# Patient Record
Sex: FEMALE | Race: OTHER | HISPANIC OR LATINO | URBAN - METROPOLITAN AREA
[De-identification: names, ages, dates, MRNs, and addresses within clinical notes are randomized per-mention and may not be internally consistent; named-entity substitution may affect disease eponyms.]

---

## 2018-04-29 ENCOUNTER — EMERGENCY (EMERGENCY)
Facility: HOSPITAL | Age: 43
LOS: 1 days | Discharge: HOME | End: 2018-04-29
Attending: EMERGENCY MEDICINE
Payer: COMMERCIAL

## 2018-04-29 VITALS
TEMPERATURE: 99 F | RESPIRATION RATE: 18 BRPM | DIASTOLIC BLOOD PRESSURE: 55 MMHG | HEART RATE: 60 BPM | OXYGEN SATURATION: 99 % | SYSTOLIC BLOOD PRESSURE: 111 MMHG

## 2018-04-29 VITALS
WEIGHT: 166.01 LBS | HEIGHT: 61 IN | DIASTOLIC BLOOD PRESSURE: 78 MMHG | SYSTOLIC BLOOD PRESSURE: 116 MMHG | HEART RATE: 60 BPM | TEMPERATURE: 98 F | RESPIRATION RATE: 16 BRPM | OXYGEN SATURATION: 100 %

## 2018-04-29 LAB
ANION GAP SERPL CALC-SCNC: 7 MMOL/L — SIGNIFICANT CHANGE UP (ref 5–17)
APPEARANCE UR: CLEAR — SIGNIFICANT CHANGE UP
BASOPHILS # BLD AUTO: 0.1 K/UL — SIGNIFICANT CHANGE UP (ref 0–0.2)
BASOPHILS NFR BLD AUTO: 1 % — SIGNIFICANT CHANGE UP (ref 0–2)
BILIRUB UR-MCNC: NEGATIVE — SIGNIFICANT CHANGE UP
BUN SERPL-MCNC: 11 MG/DL — SIGNIFICANT CHANGE UP (ref 7–18)
CALCIUM SERPL-MCNC: 8.8 MG/DL — SIGNIFICANT CHANGE UP (ref 8.4–10.5)
CHLORIDE SERPL-SCNC: 108 MMOL/L — SIGNIFICANT CHANGE UP (ref 96–108)
CO2 SERPL-SCNC: 24 MMOL/L — SIGNIFICANT CHANGE UP (ref 22–31)
COLOR SPEC: SIGNIFICANT CHANGE UP
CREAT SERPL-MCNC: 0.84 MG/DL — SIGNIFICANT CHANGE UP (ref 0.5–1.3)
DIFF PNL FLD: ABNORMAL
EOSINOPHIL # BLD AUTO: 0.1 K/UL — SIGNIFICANT CHANGE UP (ref 0–0.5)
EOSINOPHIL NFR BLD AUTO: 2 % — SIGNIFICANT CHANGE UP (ref 0–6)
GLUCOSE SERPL-MCNC: 96 MG/DL — SIGNIFICANT CHANGE UP (ref 70–99)
GLUCOSE UR QL: NEGATIVE — SIGNIFICANT CHANGE UP
HCG SERPL-ACNC: 7164 MIU/ML — SIGNIFICANT CHANGE UP
HCT VFR BLD CALC: 42.9 % — SIGNIFICANT CHANGE UP (ref 34.5–45)
HGB BLD-MCNC: 13.5 G/DL — SIGNIFICANT CHANGE UP (ref 11.5–15.5)
KETONES UR-MCNC: NEGATIVE — SIGNIFICANT CHANGE UP
LEUKOCYTE ESTERASE UR-ACNC: NEGATIVE — SIGNIFICANT CHANGE UP
LYMPHOCYTES # BLD AUTO: 2 K/UL — SIGNIFICANT CHANGE UP (ref 1–3.3)
LYMPHOCYTES # BLD AUTO: 28.3 % — SIGNIFICANT CHANGE UP (ref 13–44)
MCHC RBC-ENTMCNC: 29.4 PG — SIGNIFICANT CHANGE UP (ref 27–34)
MCHC RBC-ENTMCNC: 31.6 GM/DL — LOW (ref 32–36)
MCV RBC AUTO: 93.2 FL — SIGNIFICANT CHANGE UP (ref 80–100)
MONOCYTES # BLD AUTO: 0.4 K/UL — SIGNIFICANT CHANGE UP (ref 0–0.9)
MONOCYTES NFR BLD AUTO: 5.3 % — SIGNIFICANT CHANGE UP (ref 2–14)
NEUTROPHILS # BLD AUTO: 4.5 K/UL — SIGNIFICANT CHANGE UP (ref 1.8–7.4)
NEUTROPHILS NFR BLD AUTO: 63.4 % — SIGNIFICANT CHANGE UP (ref 43–77)
NITRITE UR-MCNC: NEGATIVE — SIGNIFICANT CHANGE UP
PH UR: 6 — SIGNIFICANT CHANGE UP (ref 5–8)
PLATELET # BLD AUTO: 380 K/UL — SIGNIFICANT CHANGE UP (ref 150–400)
POTASSIUM SERPL-MCNC: 4.1 MMOL/L — SIGNIFICANT CHANGE UP (ref 3.5–5.3)
POTASSIUM SERPL-SCNC: 4.1 MMOL/L — SIGNIFICANT CHANGE UP (ref 3.5–5.3)
PROT UR-MCNC: NEGATIVE — SIGNIFICANT CHANGE UP
RBC # BLD: 4.6 M/UL — SIGNIFICANT CHANGE UP (ref 3.8–5.2)
RBC # FLD: 12.2 % — SIGNIFICANT CHANGE UP (ref 10.3–14.5)
SODIUM SERPL-SCNC: 139 MMOL/L — SIGNIFICANT CHANGE UP (ref 135–145)
SP GR SPEC: 1.01 — SIGNIFICANT CHANGE UP (ref 1.01–1.02)
UROBILINOGEN FLD QL: NEGATIVE — SIGNIFICANT CHANGE UP
WBC # BLD: 7.1 K/UL — SIGNIFICANT CHANGE UP (ref 3.8–10.5)
WBC # FLD AUTO: 7.1 K/UL — SIGNIFICANT CHANGE UP (ref 3.8–10.5)

## 2018-04-29 PROCEDURE — 81001 URINALYSIS AUTO W/SCOPE: CPT

## 2018-04-29 PROCEDURE — 76801 OB US < 14 WKS SINGLE FETUS: CPT | Mod: 26

## 2018-04-29 PROCEDURE — 99284 EMERGENCY DEPT VISIT MOD MDM: CPT | Mod: 25

## 2018-04-29 PROCEDURE — 76817 TRANSVAGINAL US OBSTETRIC: CPT | Mod: 26

## 2018-04-29 PROCEDURE — 76801 OB US < 14 WKS SINGLE FETUS: CPT

## 2018-04-29 PROCEDURE — 99284 EMERGENCY DEPT VISIT MOD MDM: CPT

## 2018-04-29 PROCEDURE — 84702 CHORIONIC GONADOTROPIN TEST: CPT

## 2018-04-29 PROCEDURE — 80048 BASIC METABOLIC PNL TOTAL CA: CPT

## 2018-04-29 PROCEDURE — 85027 COMPLETE CBC AUTOMATED: CPT

## 2018-04-29 PROCEDURE — 76817 TRANSVAGINAL US OBSTETRIC: CPT

## 2018-04-29 NOTE — ED PROVIDER NOTE - PROGRESS NOTE DETAILS
Patient is resting comfortably, NAD. early IUP vs spontaneous . To f/u with Dr. Jordan tomorrow to compare today's HCG from the one done at the office on . Return to the ED immediately if getting worse, not improving, or if having any new or troubling symptoms.

## 2018-04-29 NOTE — ED ADULT NURSE NOTE - OBJECTIVE STATEMENT
pt is here for vaginal bleeding, lower abd pain since 6pm, 7.5 weeks pregnant, denied pain at this time, pt calm with family member.

## 2018-04-29 NOTE — ED PROVIDER NOTE - OBJECTIVE STATEMENT
43 y/o F pt w/ no significant PMHx,7.5 weeks pregnant, LMP 3.7, c/o vaginal spotting x yesterday. Today spotting became heavier w/ associated vaginal cramping. Denies fever, CP, SOB, nausea, emesis, and diarrhea. OB/GYN did US and told pt that it was too early to visualize fetus. Pt also had blood work done at the time but results are not back yet. . No other complaints. NKDA.

## 2018-04-29 NOTE — ED PROVIDER NOTE - GENITOURINARY, MLM
No discharge, lesions. No adnexal tenderness or masses. Cervix in closed. Small amt of dark red blood noted in the cervix.

## 2018-04-30 ENCOUNTER — INPATIENT (INPATIENT)
Facility: HOSPITAL | Age: 43
LOS: 0 days | Discharge: HOME | DRG: 770 | End: 2018-04-30
Attending: OBSTETRICS & GYNECOLOGY | Admitting: OBSTETRICS & GYNECOLOGY
Payer: COMMERCIAL

## 2018-04-30 VITALS
SYSTOLIC BLOOD PRESSURE: 100 MMHG | HEART RATE: 59 BPM | DIASTOLIC BLOOD PRESSURE: 63 MMHG | WEIGHT: 162.04 LBS | TEMPERATURE: 98 F | RESPIRATION RATE: 18 BRPM | HEIGHT: 61 IN | OXYGEN SATURATION: 99 %

## 2018-04-30 VITALS
DIASTOLIC BLOOD PRESSURE: 63 MMHG | RESPIRATION RATE: 14 BRPM | SYSTOLIC BLOOD PRESSURE: 102 MMHG | HEART RATE: 62 BPM | TEMPERATURE: 98 F | OXYGEN SATURATION: 100 %

## 2018-04-30 DIAGNOSIS — O03.4 INCOMPLETE SPONTANEOUS ABORTION WITHOUT COMPLICATION: ICD-10-CM

## 2018-04-30 DIAGNOSIS — Z98.890 OTHER SPECIFIED POSTPROCEDURAL STATES: Chronic | ICD-10-CM

## 2018-04-30 DIAGNOSIS — O03.9 COMPLETE OR UNSPECIFIED SPONTANEOUS ABORTION WITHOUT COMPLICATION: ICD-10-CM

## 2018-04-30 DIAGNOSIS — Z98.891 HISTORY OF UTERINE SCAR FROM PREVIOUS SURGERY: Chronic | ICD-10-CM

## 2018-04-30 LAB
ALBUMIN SERPL ELPH-MCNC: 3.8 G/DL — SIGNIFICANT CHANGE UP (ref 3.5–5)
ALP SERPL-CCNC: 45 U/L — SIGNIFICANT CHANGE UP (ref 40–120)
ALT FLD-CCNC: 12 U/L DA — SIGNIFICANT CHANGE UP (ref 10–60)
ANION GAP SERPL CALC-SCNC: 7 MMOL/L — SIGNIFICANT CHANGE UP (ref 5–17)
APTT BLD: 33.2 SEC — SIGNIFICANT CHANGE UP (ref 27.5–37.4)
AST SERPL-CCNC: 13 U/L — SIGNIFICANT CHANGE UP (ref 10–40)
BASOPHILS # BLD AUTO: 0.1 K/UL — SIGNIFICANT CHANGE UP (ref 0–0.2)
BASOPHILS NFR BLD AUTO: 0.5 % — SIGNIFICANT CHANGE UP (ref 0–2)
BILIRUB SERPL-MCNC: 0.5 MG/DL — SIGNIFICANT CHANGE UP (ref 0.2–1.2)
BUN SERPL-MCNC: 10 MG/DL — SIGNIFICANT CHANGE UP (ref 7–18)
CALCIUM SERPL-MCNC: 8.6 MG/DL — SIGNIFICANT CHANGE UP (ref 8.4–10.5)
CHLORIDE SERPL-SCNC: 110 MMOL/L — HIGH (ref 96–108)
CO2 SERPL-SCNC: 22 MMOL/L — SIGNIFICANT CHANGE UP (ref 22–31)
CREAT SERPL-MCNC: 0.8 MG/DL — SIGNIFICANT CHANGE UP (ref 0.5–1.3)
EOSINOPHIL # BLD AUTO: 0 K/UL — SIGNIFICANT CHANGE UP (ref 0–0.5)
EOSINOPHIL NFR BLD AUTO: 0.2 % — SIGNIFICANT CHANGE UP (ref 0–6)
GLUCOSE SERPL-MCNC: 97 MG/DL — SIGNIFICANT CHANGE UP (ref 70–99)
HCG SERPL-ACNC: 4442 MIU/ML — SIGNIFICANT CHANGE UP
HCT VFR BLD CALC: 38.8 % — SIGNIFICANT CHANGE UP (ref 34.5–45)
HGB BLD-MCNC: 12.8 G/DL — SIGNIFICANT CHANGE UP (ref 11.5–15.5)
INR BLD: 1.11 RATIO — SIGNIFICANT CHANGE UP (ref 0.88–1.16)
LYMPHOCYTES # BLD AUTO: 1.1 K/UL — SIGNIFICANT CHANGE UP (ref 1–3.3)
LYMPHOCYTES # BLD AUTO: 10.5 % — LOW (ref 13–44)
MCHC RBC-ENTMCNC: 30.7 PG — SIGNIFICANT CHANGE UP (ref 27–34)
MCHC RBC-ENTMCNC: 33.1 GM/DL — SIGNIFICANT CHANGE UP (ref 32–36)
MCV RBC AUTO: 92.8 FL — SIGNIFICANT CHANGE UP (ref 80–100)
MONOCYTES # BLD AUTO: 0.3 K/UL — SIGNIFICANT CHANGE UP (ref 0–0.9)
MONOCYTES NFR BLD AUTO: 2.4 % — SIGNIFICANT CHANGE UP (ref 2–14)
NEUTROPHILS # BLD AUTO: 9 K/UL — HIGH (ref 1.8–7.4)
NEUTROPHILS NFR BLD AUTO: 86.4 % — HIGH (ref 43–77)
PLATELET # BLD AUTO: 334 K/UL — SIGNIFICANT CHANGE UP (ref 150–400)
POTASSIUM SERPL-MCNC: 3.9 MMOL/L — SIGNIFICANT CHANGE UP (ref 3.5–5.3)
POTASSIUM SERPL-SCNC: 3.9 MMOL/L — SIGNIFICANT CHANGE UP (ref 3.5–5.3)
PROT SERPL-MCNC: 7.7 G/DL — SIGNIFICANT CHANGE UP (ref 6–8.3)
PROTHROM AB SERPL-ACNC: 12.1 SEC — SIGNIFICANT CHANGE UP (ref 9.8–12.7)
RBC # BLD: 4.18 M/UL — SIGNIFICANT CHANGE UP (ref 3.8–5.2)
RBC # FLD: 12.2 % — SIGNIFICANT CHANGE UP (ref 10.3–14.5)
SODIUM SERPL-SCNC: 139 MMOL/L — SIGNIFICANT CHANGE UP (ref 135–145)
WBC # BLD: 10.4 K/UL — SIGNIFICANT CHANGE UP (ref 3.8–10.5)
WBC # FLD AUTO: 10.4 K/UL — SIGNIFICANT CHANGE UP (ref 3.8–10.5)

## 2018-04-30 PROCEDURE — 85610 PROTHROMBIN TIME: CPT

## 2018-04-30 PROCEDURE — 86901 BLOOD TYPING SEROLOGIC RH(D): CPT

## 2018-04-30 PROCEDURE — 85027 COMPLETE CBC AUTOMATED: CPT

## 2018-04-30 PROCEDURE — 80053 COMPREHEN METABOLIC PANEL: CPT

## 2018-04-30 PROCEDURE — 99285 EMERGENCY DEPT VISIT HI MDM: CPT

## 2018-04-30 PROCEDURE — 88305 TISSUE EXAM BY PATHOLOGIST: CPT | Mod: 26

## 2018-04-30 PROCEDURE — 88305 TISSUE EXAM BY PATHOLOGIST: CPT

## 2018-04-30 PROCEDURE — 86900 BLOOD TYPING SEROLOGIC ABO: CPT

## 2018-04-30 PROCEDURE — 86850 RBC ANTIBODY SCREEN: CPT

## 2018-04-30 PROCEDURE — 99285 EMERGENCY DEPT VISIT HI MDM: CPT | Mod: 25

## 2018-04-30 PROCEDURE — 85730 THROMBOPLASTIN TIME PARTIAL: CPT

## 2018-04-30 PROCEDURE — 96374 THER/PROPH/DIAG INJ IV PUSH: CPT

## 2018-04-30 PROCEDURE — 84702 CHORIONIC GONADOTROPIN TEST: CPT

## 2018-04-30 RX ORDER — SODIUM CHLORIDE 9 MG/ML
1000 INJECTION INTRAMUSCULAR; INTRAVENOUS; SUBCUTANEOUS ONCE
Qty: 0 | Refills: 0 | Status: COMPLETED | OUTPATIENT
Start: 2018-04-30 | End: 2018-04-30

## 2018-04-30 RX ORDER — IBUPROFEN 200 MG
1 TABLET ORAL
Qty: 28 | Refills: 0
Start: 2018-04-30 | End: 2018-05-06

## 2018-04-30 RX ORDER — IBUPROFEN 200 MG
600 TABLET ORAL ONCE
Qty: 0 | Refills: 0 | Status: DISCONTINUED | OUTPATIENT
Start: 2018-04-30 | End: 2018-04-30

## 2018-04-30 RX ORDER — ACETAMINOPHEN 500 MG
650 TABLET ORAL ONCE
Qty: 0 | Refills: 0 | Status: COMPLETED | OUTPATIENT
Start: 2018-04-30 | End: 2018-04-30

## 2018-04-30 RX ORDER — HYDROMORPHONE HYDROCHLORIDE 2 MG/ML
0.5 INJECTION INTRAMUSCULAR; INTRAVENOUS; SUBCUTANEOUS
Qty: 0 | Refills: 0 | Status: DISCONTINUED | OUTPATIENT
Start: 2018-04-30 | End: 2018-04-30

## 2018-04-30 RX ORDER — MORPHINE SULFATE 50 MG/1
2 CAPSULE, EXTENDED RELEASE ORAL ONCE
Qty: 0 | Refills: 0 | Status: DISCONTINUED | OUTPATIENT
Start: 2018-04-30 | End: 2018-04-30

## 2018-04-30 RX ORDER — SODIUM CHLORIDE 9 MG/ML
1000 INJECTION, SOLUTION INTRAVENOUS
Qty: 0 | Refills: 0 | Status: DISCONTINUED | OUTPATIENT
Start: 2018-04-30 | End: 2018-04-30

## 2018-04-30 RX ORDER — OXYCODONE AND ACETAMINOPHEN 5; 325 MG/1; MG/1
2 TABLET ORAL ONCE
Qty: 0 | Refills: 0 | Status: DISCONTINUED | OUTPATIENT
Start: 2018-04-30 | End: 2018-04-30

## 2018-04-30 RX ORDER — SODIUM CHLORIDE 9 MG/ML
3 INJECTION INTRAMUSCULAR; INTRAVENOUS; SUBCUTANEOUS EVERY 8 HOURS
Qty: 0 | Refills: 0 | Status: DISCONTINUED | OUTPATIENT
Start: 2018-04-30 | End: 2018-04-30

## 2018-04-30 RX ADMIN — MORPHINE SULFATE 2 MILLIGRAM(S): 50 CAPSULE, EXTENDED RELEASE ORAL at 12:22

## 2018-04-30 RX ADMIN — SODIUM CHLORIDE 1000 MILLILITER(S): 9 INJECTION INTRAMUSCULAR; INTRAVENOUS; SUBCUTANEOUS at 12:07

## 2018-04-30 NOTE — DISCHARGE NOTE ADULT - ADDITIONAL INSTRUCTIONS
Pelvic rest,  no sexual intercourse and nothing in vagina ( ie tampons). Motrin as needed for pain. No strenuous activity and  no heavy lifting. Follow up with your gynecologist in 1-2wks for your post op visit

## 2018-04-30 NOTE — DISCHARGE NOTE ADULT - INSTRUCTIONS
none Nothing in vagina, no sex, no tampons, no douching, no baths, no foreign objects Nothing in vagina, no sex, no tampons, no douching, no baths, no foreign objects until your next follow up with your gynecologist in 1-2wks for your post op visit.

## 2018-04-30 NOTE — ED PROVIDER NOTE - MEDICAL DECISION MAKING DETAILS
Pt in first trimester pregnancy w/ vaginal bleeding and abd pain; miscarriage in progress send for D and C w/ Dr. Bee.

## 2018-04-30 NOTE — DISCHARGE NOTE ADULT - CARE PROVIDERS DIRECT ADDRESSES
[email or web address]

## 2018-04-30 NOTE — H&P ADULT - NSHPLABSRESULTS_GEN_ALL_CORE
LABS:                        12.8   10.4  )-----------( 334      ( 2018 12:57 )             38.8         139  |  108  |  11  ----------------------------<  96  4.1   |  24  |  0.84    Ca    8.8      2018 09:36    HCG Quantitative, Serum (18 @ 09:36)    HCG Quantitative, Serum: 7164:  on 2018              hcg 7031 in office on 2018      Urinalysis Basic - ( 2018 09:36 )    Color: Pale Yellow / Appearance: Clear / S.015 / pH: x  Gluc: x / Ketone: Negative  / Bili: Negative / Urobili: Negative   Blood: x / Protein: Negative / Nitrite: Negative   Leuk Esterase: Negative / RBC: 0-2 /HPF / WBC 0-2 /HPF   Sq Epi: x / Non Sq Epi: Few /HPF / Bacteria: Moderate /HPF        RADIOLOGY & ADDITIONAL STUDIES:  sono  < from: US Echo Transvaginal, OB (18 @ 10:35) >  INDICATION: vaginalbleeding.  COMPARISON: 2014.    TECHNIQUE: Transabdominal and transvaginal ultrasound of the pelvis was   performed. Color/spectral Doppler of the ovaries was performed.    FINDINGS:  Uterus measures 7.7 x 4.7 x 4.5 cm. There is an irregular intrauterine   saclike structure with mean sac diameter of 1.3 cm at this represents a   gestational sac, which corresponds to 5 weeks and 4 days. No yolk sac or   fetal pole is visualized.    Right ovary measures 2.6 x 1.5 x 2.6 cm.  Left ovary measures 2.1 x 1.7 x 1.9 cm.  Vascular flow was documented in both ovaries.    No free pelvic fluid detected.    IMPRESSION:  Intrauterine irregular sac like structure may represent an early   gestational sac versus pseudogestational sac. No yolk sac or fetal pole   is visualized. Recommend correlation with beta-hCG and follow-up   ultrasound.    < end of copied text >

## 2018-04-30 NOTE — H&P ADULT - HISTORY OF PRESENT ILLNESS
@ 7.5 weekLMP presents with vb, vaginal discharge; pelvic pain, abd pain, cp, sob, dizziness, palpitations, n/v/d/c, fever; chills     pob/gynhx: G P ; std's; no abn pap smear; no fibroids; meses  pmhx:  pshx:  all:  meds:   sochx: no etoh/drug/tobacco use    REVIEW OF SYSTEMS: see HPI	    PE:  Vital Signs Last 24 Hrs  T(C): 36.4 (2018 11:00), Max: 36.4 (2018 11:00)  T(F): 97.6 (2018 11:00), Max: 97.6 (2018 11:00)  HR: 59 (:) (59 - 59)  BP: 100/63 (2018 11:00) (100/63 - 100/63)  BP(mean): --  RR: 18 (2018 11:00) (18 - 18)  SpO2: 99% (2018 11:00) (99% - 99%)  abd: +bs; soft, nt, nd, no rebound or guarding; no cvat b/l  pelvis: no cmt; no vaginal bleeding or abnormal discharge; no odor, closed/long, no uterine tenderness; uterus approx 8wks size regular in contour, mobile. No adnexal tenderness or masses appreciated b/l     LABS:                        12.8   10.4  )-----------( 334      ( 2018 12:57 )             38.8     04-29    139  |  108  |  11  ----------------------------<  96  4.1   |  24  |  0.84    Ca    8.8      2018 09:36        Urinalysis Basic - ( 2018 09:36 )    Color: Pale Yellow / Appearance: Clear / S.015 / pH: x  Gluc: x / Ketone: Negative  / Bili: Negative / Urobili: Negative   Blood: x / Protein: Negative / Nitrite: Negative   Leuk Esterase: Negative / RBC: 0-2 /HPF / WBC 0-2 /HPF   Sq Epi: x / Non Sq Epi: Few /HPF / Bacteria: Moderate /HPF        RADIOLOGY & ADDITIONAL STUDIES:  sono  ct scan    a/p    -d/w  41 yo F  @ 7.5 week LMP 3/7/2018 presents with worsening vaginal bleeding x 2 days. Patient also report cramping pain x 1 day. Patient was seen in ED on  and discharged with threatened ab precautions. Patient was seen today by GYN Dr Jordan as was diagnosed with incomplete ab and was told to come to hospital for D+C. Patient denies pain at this time, was given Morphine by ED team.  Dr Jordan unavailable to perform D+C at this time, he asked Dr Bee, [place] attending to cover. Denies cp, sob, dizziness, palpitations, n/v/d/c, fever; chills     pob/gynhx:   c-sections x 1 secondary to previous myomectomy. male 8lb 12 oz.  male ;  abn pap smear in , since then all wnl.;  hx of fibroids, normal menses,   pmhx: denies  pshx: , lap myomectomy . left knee acl repair x 2  all: denies  meds: denies  sochx: no etoh/drug/tobacco use    REVIEW OF SYSTEMS: see HPI	    PE:  Vital Signs Last 24 Hrs  T(C): 36.4 (2018 11:00), Max: 36.4 (2018 11:00)  T(F): 97.6 (2018 11:00), Max: 97.6 (2018 11:00)  HR: 59 (2018 11:00) (59 - 59)  BP: 100/63 (2018 11:00) (100/63 - 100/63)  BP(mean): --  RR: 18 (2018 11:00) (18 - 18)  SpO2: 99% (2018 11:00) (99% - 99%)    gen: nad, pain well controlled  abd: +bs; soft, nt, nd, no rebound or guarding; no cvat b/l  pelvis: + dark red blood with clots, cervix about 1 cm dilated, no uterine or adnexal tenderness    LABS:                        12.8   10.4  )-----------( 334      ( 2018 12:57 )             38.8     04-29    139  |  108  |  11  ----------------------------<  96  4.1   |  24  |  0.84    Ca    8.8      2018 09:36    HCG Quantitative, Serum (18 @ 09:36)    HCG Quantitative, Serum: 7164:  on 2018              hcg 7031 in office on 2018      Urinalysis Basic - ( 2018 09:36 )    Color: Pale Yellow / Appearance: Clear / S.015 / pH: x  Gluc: x / Ketone: Negative  / Bili: Negative / Urobili: Negative   Blood: x / Protein: Negative / Nitrite: Negative   Leuk Esterase: Negative / RBC: 0-2 /HPF / WBC 0-2 /HPF   Sq Epi: x / Non Sq Epi: Few /HPF / Bacteria: Moderate /HPF        RADIOLOGY & ADDITIONAL STUDIES:  sono  < from: US Echo Transvaginal, OB (18 @ 10:35) >  INDICATION: vaginalbleeding.  COMPARISON: 2014.    TECHNIQUE: Transabdominal and transvaginal ultrasound of the pelvis was   performed. Color/spectral Doppler of the ovaries was performed.    FINDINGS:  Uterus measures 7.7 x 4.7 x 4.5 cm. There is an irregular intrauterine   saclike structure with mean sac diameter of 1.3 cm at this represents a   gestational sac, which corresponds to 5 weeks and 4 days. No yolk sac or   fetal pole is visualized.    Right ovary measures 2.6 x 1.5 x 2.6 cm.  Left ovary measures 2.1 x 1.7 x 1.9 cm.  Vascular flow was documented in both ovaries.    No free pelvic fluid detected.    IMPRESSION:  Intrauterine irregular sac like structure may represent an early   gestational sac versus pseudogestational sac. No yolk sac or fetal pole   is visualized. Recommend correlation with beta-hCG and follow-up   ultrasound.    < end of copied text >      a/p 42 y F  @ 7.5 weeks with incomplete ab   - will admit for D+C  -coags ordered  -informed consent obtained  - keep npo  -d/w Dr Jordan and Dr Bee

## 2018-04-30 NOTE — H&P ADULT - ASSESSMENT
a/p 42 y F  @ 7.5 weeks with incomplete ab   - will admit for D+C  -coags ordered  -informed consent obtained  - keep npo  -d/w Dr Jordan and Dr Bee

## 2018-04-30 NOTE — DISCHARGE NOTE ADULT - CARE PROVIDER_API CALL
Franklyn Jordan), Obstetrics and Gynecology  [address] [place] [address]  Phone: [phone]  Fax: [phone]    Christina Bee), Obstetrics and Gynecology  [facility] [address] [place] [address]  Phone: [phone]  Fax: [phone]

## 2018-04-30 NOTE — DISCHARGE NOTE ADULT - MEDICATION SUMMARY - MEDICATIONS TO TAKE
I will START or STAY ON the medications listed below when I get home from the hospital:    ibuprofen 600 mg oral tablet  -- 1 tab(s) by mouth every 6 hours, As Needed -moderate pain  -- Indication: For pain

## 2018-04-30 NOTE — DISCHARGE NOTE ADULT - PATIENT PORTAL LINK FT
You can access the [email or web address], offered by [facility], by registering with the following website: [email or web address]

## 2018-04-30 NOTE — DISCHARGE NOTE ADULT - PLAN OF CARE
D+C Pelvic rest,  no sexual intercourse and nothing in vagina ( ie tampons). Motrin as needed for pain. No strenuous activity and  no heavy lifting.  Follow up with your gynecologist in 1-2wks for your post op visit

## 2018-04-30 NOTE — DISCHARGE NOTE ADULT - HOSPITAL COURSE
Patient sent in from office with incomplete ab for D+C   patient underwent dilation and curettage with suction  routine post op care given

## 2018-04-30 NOTE — H&P ADULT - NSHPPHYSICALEXAM_GEN_ALL_CORE
gen: nad, pain well controlled  abd: +bs; soft, nt, nd, no rebound or guarding; no cvat b/l  pelvis: + dark red blood with clots, cervix about 1 cm dilated, no uterine or adnexal tenderness

## 2018-04-30 NOTE — ED PROVIDER NOTE - OBJECTIVE STATEMENT
43 y/o F w/ no pertinent PMHx or PSHx; last LMP 03/07/2018 presents to ED c/o vaginal bleeding. Pt notes she is 7 w 5 days pregnant w/ increasing vaginal bleeding and lower bad pain x few days. Pt was seen here yesterday w/ ultrasound and full evaluation; no fetal pulse detected at that time. Pt was advised to return if worsens. Today Pt was w/ Dr. Peterson in office and referred here for D&C; admit under Dr. Bee. Pt denies any other complaints. NKDA.

## 2018-04-30 NOTE — DISCHARGE NOTE ADULT - CARE PLAN
Principal Discharge DX:	Incomplete   Goal:	D+C  Assessment and plan of treatment:	Pelvic rest,  no sexual intercourse and nothing in vagina ( ie tampons). Motrin as needed for pain. No strenuous activity and  no heavy lifting.  Follow up with your gynecologist in 1-2wks for your post op visit

## 2018-04-30 NOTE — DISCHARGE NOTE ADULT - NS AS ACTIVITY OBS
Showering allowed/Walking-Outdoors allowed/Walking-Indoors allowed/Stairs allowed/No Heavy lifting/straining/Do not make important decisions/Do not drive or operate machinery

## 2018-05-02 LAB — SURGICAL PATHOLOGY FINAL REPORT - CH: SIGNIFICANT CHANGE UP

## 2019-10-22 ENCOUNTER — OUTPATIENT (OUTPATIENT)
Dept: OUTPATIENT SERVICES | Facility: HOSPITAL | Age: 44
LOS: 1 days | End: 2019-10-22
Payer: COMMERCIAL

## 2019-10-22 DIAGNOSIS — Z98.891 HISTORY OF UTERINE SCAR FROM PREVIOUS SURGERY: Chronic | ICD-10-CM

## 2019-10-22 DIAGNOSIS — Z98.890 OTHER SPECIFIED POSTPROCEDURAL STATES: Chronic | ICD-10-CM

## 2019-10-22 DIAGNOSIS — Z3A.39 39 WEEKS GESTATION OF PREGNANCY: ICD-10-CM

## 2019-10-22 DIAGNOSIS — Z01.818 ENCOUNTER FOR OTHER PREPROCEDURAL EXAMINATION: ICD-10-CM

## 2019-10-22 LAB
ANION GAP SERPL CALC-SCNC: 7 MMOL/L — SIGNIFICANT CHANGE UP (ref 5–17)
APTT BLD: 30.2 SEC — SIGNIFICANT CHANGE UP (ref 27.5–36.3)
BLD GP AB SCN SERPL QL: SIGNIFICANT CHANGE UP
BUN SERPL-MCNC: 8 MG/DL — SIGNIFICANT CHANGE UP (ref 7–18)
CALCIUM SERPL-MCNC: 8.9 MG/DL — SIGNIFICANT CHANGE UP (ref 8.4–10.5)
CHLORIDE SERPL-SCNC: 111 MMOL/L — HIGH (ref 96–108)
CO2 SERPL-SCNC: 23 MMOL/L — SIGNIFICANT CHANGE UP (ref 22–31)
CREAT SERPL-MCNC: 0.77 MG/DL — SIGNIFICANT CHANGE UP (ref 0.5–1.3)
GLUCOSE SERPL-MCNC: 104 MG/DL — HIGH (ref 70–99)
HCT VFR BLD CALC: 37 % — SIGNIFICANT CHANGE UP (ref 34.5–45)
HGB BLD-MCNC: 11.8 G/DL — SIGNIFICANT CHANGE UP (ref 11.5–15.5)
INR BLD: 0.99 RATIO — SIGNIFICANT CHANGE UP (ref 0.88–1.16)
MCHC RBC-ENTMCNC: 28.6 PG — SIGNIFICANT CHANGE UP (ref 27–34)
MCHC RBC-ENTMCNC: 31.9 GM/DL — LOW (ref 32–36)
MCV RBC AUTO: 89.6 FL — SIGNIFICANT CHANGE UP (ref 80–100)
NRBC # BLD: 0 /100 WBCS — SIGNIFICANT CHANGE UP (ref 0–0)
PLATELET # BLD AUTO: 337 K/UL — SIGNIFICANT CHANGE UP (ref 150–400)
POTASSIUM SERPL-MCNC: 4 MMOL/L — SIGNIFICANT CHANGE UP (ref 3.5–5.3)
POTASSIUM SERPL-SCNC: 4 MMOL/L — SIGNIFICANT CHANGE UP (ref 3.5–5.3)
PROTHROM AB SERPL-ACNC: 11 SEC — SIGNIFICANT CHANGE UP (ref 10–12.9)
RBC # BLD: 4.13 M/UL — SIGNIFICANT CHANGE UP (ref 3.8–5.2)
RBC # FLD: 15.7 % — HIGH (ref 10.3–14.5)
SODIUM SERPL-SCNC: 141 MMOL/L — SIGNIFICANT CHANGE UP (ref 135–145)
WBC # BLD: 8.68 K/UL — SIGNIFICANT CHANGE UP (ref 3.8–10.5)
WBC # FLD AUTO: 8.68 K/UL — SIGNIFICANT CHANGE UP (ref 3.8–10.5)

## 2019-10-22 PROCEDURE — G0463: CPT

## 2019-10-23 ENCOUNTER — INPATIENT (INPATIENT)
Facility: HOSPITAL | Age: 44
LOS: 2 days | Discharge: HOME | End: 2019-10-26
Attending: OBSTETRICS & GYNECOLOGY | Admitting: OBSTETRICS & GYNECOLOGY
Payer: COMMERCIAL

## 2019-10-23 VITALS — HEIGHT: 61 IN | WEIGHT: 202.83 LBS

## 2019-10-23 DIAGNOSIS — Z98.891 HISTORY OF UTERINE SCAR FROM PREVIOUS SURGERY: Chronic | ICD-10-CM

## 2019-10-23 DIAGNOSIS — Z98.890 OTHER SPECIFIED POSTPROCEDURAL STATES: Chronic | ICD-10-CM

## 2019-10-23 DIAGNOSIS — Z3A.39 39 WEEKS GESTATION OF PREGNANCY: ICD-10-CM

## 2019-10-23 LAB
T PALLIDUM AB TITR SER: NEGATIVE — SIGNIFICANT CHANGE UP
T PALLIDUM AB TITR SER: NEGATIVE — SIGNIFICANT CHANGE UP

## 2019-10-23 PROCEDURE — 88307 TISSUE EXAM BY PATHOLOGIST: CPT | Mod: 26

## 2019-10-23 RX ORDER — CEFAZOLIN SODIUM 1 G
2000 VIAL (EA) INJECTION ONCE
Refills: 0 | Status: COMPLETED | OUTPATIENT
Start: 2019-10-23 | End: 2019-10-23

## 2019-10-23 RX ORDER — ASCORBIC ACID 60 MG
500 TABLET,CHEWABLE ORAL DAILY
Refills: 0 | Status: DISCONTINUED | OUTPATIENT
Start: 2019-10-24 | End: 2019-10-26

## 2019-10-23 RX ORDER — SIMETHICONE 80 MG/1
80 TABLET, CHEWABLE ORAL EVERY 4 HOURS
Refills: 0 | Status: DISCONTINUED | OUTPATIENT
Start: 2019-10-23 | End: 2019-10-26

## 2019-10-23 RX ORDER — FAMOTIDINE 10 MG/ML
20 INJECTION INTRAVENOUS ONCE
Refills: 0 | Status: DISCONTINUED | OUTPATIENT
Start: 2019-10-23 | End: 2019-10-23

## 2019-10-23 RX ORDER — TETANUS TOXOID, REDUCED DIPHTHERIA TOXOID AND ACELLULAR PERTUSSIS VACCINE, ADSORBED 5; 2.5; 8; 8; 2.5 [IU]/.5ML; [IU]/.5ML; UG/.5ML; UG/.5ML; UG/.5ML
0.5 SUSPENSION INTRAMUSCULAR ONCE
Refills: 0 | Status: DISCONTINUED | OUTPATIENT
Start: 2019-10-23 | End: 2019-10-26

## 2019-10-23 RX ORDER — FERROUS SULFATE 325(65) MG
325 TABLET ORAL DAILY
Refills: 0 | Status: DISCONTINUED | OUTPATIENT
Start: 2019-10-24 | End: 2019-10-26

## 2019-10-23 RX ORDER — OXYCODONE HYDROCHLORIDE 5 MG/1
5 TABLET ORAL
Refills: 0 | Status: DISCONTINUED | OUTPATIENT
Start: 2019-10-23 | End: 2019-10-26

## 2019-10-23 RX ORDER — FOLIC ACID 0.8 MG
1 TABLET ORAL DAILY
Refills: 0 | Status: DISCONTINUED | OUTPATIENT
Start: 2019-10-24 | End: 2019-10-26

## 2019-10-23 RX ORDER — METOCLOPRAMIDE HCL 10 MG
10 TABLET ORAL ONCE
Refills: 0 | Status: COMPLETED | OUTPATIENT
Start: 2019-10-23 | End: 2019-10-23

## 2019-10-23 RX ORDER — LANOLIN
1 OINTMENT (GRAM) TOPICAL EVERY 6 HOURS
Refills: 0 | Status: DISCONTINUED | OUTPATIENT
Start: 2019-10-23 | End: 2019-10-26

## 2019-10-23 RX ORDER — OXYTOCIN 10 UNIT/ML
41.67 VIAL (ML) INJECTION
Qty: 20 | Refills: 0 | Status: DISCONTINUED | OUTPATIENT
Start: 2019-10-23 | End: 2019-10-24

## 2019-10-23 RX ORDER — OXYTOCIN 10 UNIT/ML
333.33 VIAL (ML) INJECTION
Qty: 20 | Refills: 0 | Status: DISCONTINUED | OUTPATIENT
Start: 2019-10-23 | End: 2019-10-24

## 2019-10-23 RX ORDER — CITRIC ACID/SODIUM CITRATE 300-500 MG
30 SOLUTION, ORAL ORAL ONCE
Refills: 0 | Status: COMPLETED | OUTPATIENT
Start: 2019-10-23 | End: 2019-10-23

## 2019-10-23 RX ORDER — SODIUM CHLORIDE 9 MG/ML
1000 INJECTION, SOLUTION INTRAVENOUS ONCE
Refills: 0 | Status: DISCONTINUED | OUTPATIENT
Start: 2019-10-23 | End: 2019-10-23

## 2019-10-23 RX ORDER — IBUPROFEN 200 MG
600 TABLET ORAL EVERY 6 HOURS
Refills: 0 | Status: COMPLETED | OUTPATIENT
Start: 2019-10-23 | End: 2020-09-20

## 2019-10-23 RX ORDER — OXYCODONE HYDROCHLORIDE 5 MG/1
5 TABLET ORAL ONCE
Refills: 0 | Status: DISCONTINUED | OUTPATIENT
Start: 2019-10-23 | End: 2019-10-26

## 2019-10-23 RX ORDER — ACETAMINOPHEN 500 MG
1000 TABLET ORAL ONCE
Refills: 0 | Status: COMPLETED | OUTPATIENT
Start: 2019-10-23 | End: 2019-10-23

## 2019-10-23 RX ORDER — SODIUM CHLORIDE 9 MG/ML
1000 INJECTION, SOLUTION INTRAVENOUS
Refills: 0 | Status: DISCONTINUED | OUTPATIENT
Start: 2019-10-23 | End: 2019-10-23

## 2019-10-23 RX ORDER — KETOROLAC TROMETHAMINE 30 MG/ML
30 SYRINGE (ML) INJECTION EVERY 6 HOURS
Refills: 0 | Status: DISCONTINUED | OUTPATIENT
Start: 2019-10-23 | End: 2019-10-24

## 2019-10-23 RX ORDER — ACETAMINOPHEN 500 MG
975 TABLET ORAL
Refills: 0 | Status: DISCONTINUED | OUTPATIENT
Start: 2019-10-23 | End: 2019-10-26

## 2019-10-23 RX ORDER — HEPARIN SODIUM 5000 [USP'U]/ML
5000 INJECTION INTRAVENOUS; SUBCUTANEOUS EVERY 12 HOURS
Refills: 0 | Status: DISCONTINUED | OUTPATIENT
Start: 2019-10-24 | End: 2019-10-26

## 2019-10-23 RX ORDER — DIPHENHYDRAMINE HCL 50 MG
25 CAPSULE ORAL EVERY 6 HOURS
Refills: 0 | Status: DISCONTINUED | OUTPATIENT
Start: 2019-10-23 | End: 2019-10-26

## 2019-10-23 RX ORDER — SODIUM CHLORIDE 9 MG/ML
1000 INJECTION, SOLUTION INTRAVENOUS
Refills: 0 | Status: DISCONTINUED | OUTPATIENT
Start: 2019-10-23 | End: 2019-10-24

## 2019-10-23 RX ADMIN — Medication 30 MILLIGRAM(S): at 11:58

## 2019-10-23 RX ADMIN — Medication 400 MILLIGRAM(S): at 12:28

## 2019-10-23 RX ADMIN — Medication 30 MILLILITER(S): at 09:52

## 2019-10-23 RX ADMIN — Medication 975 MILLIGRAM(S): at 21:25

## 2019-10-23 RX ADMIN — Medication 975 MILLIGRAM(S): at 22:20

## 2019-10-23 RX ADMIN — SIMETHICONE 80 MILLIGRAM(S): 80 TABLET, CHEWABLE ORAL at 21:26

## 2019-10-23 RX ADMIN — Medication 100 MILLIGRAM(S): at 09:52

## 2019-10-23 RX ADMIN — Medication 30 MILLIGRAM(S): at 19:40

## 2019-10-23 RX ADMIN — Medication 10 MILLIGRAM(S): at 13:24

## 2019-10-23 RX ADMIN — Medication 30 MILLIGRAM(S): at 12:27

## 2019-10-23 RX ADMIN — Medication 1000 MILLIGRAM(S): at 13:22

## 2019-10-23 RX ADMIN — Medication 30 MILLIGRAM(S): at 19:25

## 2019-10-23 NOTE — CHART NOTE - NSCHARTNOTEFT_GEN_A_CORE
Pt seen at bedside offers no complaints. Denies n/v, cp; sob; palpitations; or dizziness    T(C): 36.4 (10-23-19 @ 11:50), Max: 36.4 (10-23-19 @ 11:50)  HR: 79 (10-23-19 @ 13:50) (69 - 84)  BP: 101/48 (10-23-19 @ 13:50) (101/48 - 119/43)  RR: 16 (10-23-19 @ 13:50) (11 - 22)  SpO2: 100% (10-23-19 @ 13:50) (97% - 100%)    abd: soft/nt, fundus firm, dressing in place C/D/I  pelvic: minimal lochia  ext: venodynes in place; no calf pain    a/p POD #0 s/p rpt c/s   cont close monitor   cont post op care  d/w dr Vo

## 2019-10-24 LAB
BASOPHILS # BLD AUTO: 0.03 K/UL — SIGNIFICANT CHANGE UP (ref 0–0.2)
BASOPHILS NFR BLD AUTO: 0.3 % — SIGNIFICANT CHANGE UP (ref 0–2)
EOSINOPHIL # BLD AUTO: 0.09 K/UL — SIGNIFICANT CHANGE UP (ref 0–0.5)
EOSINOPHIL NFR BLD AUTO: 1 % — SIGNIFICANT CHANGE UP (ref 0–6)
HCT VFR BLD CALC: 28.7 % — LOW (ref 34.5–45)
HGB BLD-MCNC: 9.3 G/DL — LOW (ref 11.5–15.5)
IMM GRANULOCYTES NFR BLD AUTO: 0.4 % — SIGNIFICANT CHANGE UP (ref 0–1.5)
LYMPHOCYTES # BLD AUTO: 2.28 K/UL — SIGNIFICANT CHANGE UP (ref 1–3.3)
LYMPHOCYTES # BLD AUTO: 24.5 % — SIGNIFICANT CHANGE UP (ref 13–44)
MCHC RBC-ENTMCNC: 28.9 PG — SIGNIFICANT CHANGE UP (ref 27–34)
MCHC RBC-ENTMCNC: 32.4 GM/DL — SIGNIFICANT CHANGE UP (ref 32–36)
MCV RBC AUTO: 89.1 FL — SIGNIFICANT CHANGE UP (ref 80–100)
MONOCYTES # BLD AUTO: 0.65 K/UL — SIGNIFICANT CHANGE UP (ref 0–0.9)
MONOCYTES NFR BLD AUTO: 7 % — SIGNIFICANT CHANGE UP (ref 2–14)
NEUTROPHILS # BLD AUTO: 6.2 K/UL — SIGNIFICANT CHANGE UP (ref 1.8–7.4)
NEUTROPHILS NFR BLD AUTO: 66.8 % — SIGNIFICANT CHANGE UP (ref 43–77)
NRBC # BLD: 0 /100 WBCS — SIGNIFICANT CHANGE UP (ref 0–0)
PLATELET # BLD AUTO: 269 K/UL — SIGNIFICANT CHANGE UP (ref 150–400)
RBC # BLD: 3.22 M/UL — LOW (ref 3.8–5.2)
RBC # FLD: 15.6 % — HIGH (ref 10.3–14.5)
WBC # BLD: 9.29 K/UL — SIGNIFICANT CHANGE UP (ref 3.8–10.5)
WBC # FLD AUTO: 9.29 K/UL — SIGNIFICANT CHANGE UP (ref 3.8–10.5)

## 2019-10-24 RX ORDER — LORATADINE 10 MG/1
1 TABLET ORAL
Qty: 0 | Refills: 0 | DISCHARGE

## 2019-10-24 RX ORDER — IBUPROFEN 200 MG
600 TABLET ORAL EVERY 6 HOURS
Refills: 0 | Status: DISCONTINUED | OUTPATIENT
Start: 2019-10-24 | End: 2019-10-26

## 2019-10-24 RX ADMIN — Medication 975 MILLIGRAM(S): at 21:38

## 2019-10-24 RX ADMIN — Medication 30 MILLIGRAM(S): at 00:27

## 2019-10-24 RX ADMIN — Medication 325 MILLIGRAM(S): at 13:48

## 2019-10-24 RX ADMIN — Medication 30 MILLIGRAM(S): at 06:17

## 2019-10-24 RX ADMIN — Medication 30 MILLIGRAM(S): at 00:12

## 2019-10-24 RX ADMIN — Medication 1 TABLET(S): at 13:47

## 2019-10-24 RX ADMIN — Medication 975 MILLIGRAM(S): at 04:30

## 2019-10-24 RX ADMIN — Medication 600 MILLIGRAM(S): at 19:30

## 2019-10-24 RX ADMIN — Medication 975 MILLIGRAM(S): at 10:15

## 2019-10-24 RX ADMIN — Medication 500 MILLIGRAM(S): at 13:48

## 2019-10-24 RX ADMIN — Medication 1 MILLIGRAM(S): at 13:48

## 2019-10-24 RX ADMIN — HEPARIN SODIUM 5000 UNIT(S): 5000 INJECTION INTRAVENOUS; SUBCUTANEOUS at 00:12

## 2019-10-24 RX ADMIN — Medication 600 MILLIGRAM(S): at 18:53

## 2019-10-24 RX ADMIN — Medication 600 MILLIGRAM(S): at 13:47

## 2019-10-24 RX ADMIN — Medication 30 MILLIGRAM(S): at 06:02

## 2019-10-24 RX ADMIN — HEPARIN SODIUM 5000 UNIT(S): 5000 INJECTION INTRAVENOUS; SUBCUTANEOUS at 13:48

## 2019-10-24 RX ADMIN — Medication 975 MILLIGRAM(S): at 03:35

## 2019-10-24 RX ADMIN — Medication 975 MILLIGRAM(S): at 21:08

## 2019-10-24 RX ADMIN — Medication 975 MILLIGRAM(S): at 10:51

## 2019-10-24 RX ADMIN — Medication 600 MILLIGRAM(S): at 14:16

## 2019-10-24 NOTE — DISCHARGE NOTE OB - PLAN OF CARE
routine wound check in 2weeks call and set up appointment no sex nothing in vagina no heavy lifting no pushing no straining no strenuous activities  pain medication as needed; stool softener; dulcolax as needed if constipated  walk for exercise: helps recovery   continue prenatal vitamins daily especially whole course of breastfeeding  see your OB in the office for follow up post partum check call the office set up appointment in 1-2weeks  clean wound daily with soap and water; please note wound for redness or swelling; if noted go to the office right away so the doctor can evaluate the wound for possible wound infection iron supplement

## 2019-10-24 NOTE — PROGRESS NOTE ADULT - SUBJECTIVE AND OBJECTIVE BOX
PA NOTE:  POD#1 s/p sched rpt cs   in the room     Patient seen at bedside resting comfortably offers no new complaints. + Ambulation, voiding without difficulty,  both breastfeeding and bottle feeding. Denies HA, CP, SOB, N/V/D,  no bm; dizziness, palpitations, worsening abdominal pain, worsening vaginal bleeding, or any other concerns.     Vital Signs Last 24 Hrs  T(C): 36.7 (24 Oct 2019 05:46), Max: 36.8 (23 Oct 2019 22:48)  T(F): 98 (24 Oct 2019 05:46), Max: 98.3 (23 Oct 2019 22:48)  HR: 62 (24 Oct 2019 05:46) (62 - 84)  BP: 97/60 (24 Oct 2019 05:46) (97/60 - 119/43)  BP(mean): 59 (23 Oct 2019 13:50) (58 - 62)  RR: 16 (24 Oct 2019 05:46) (11 - 22)  SpO2: 97% (24 Oct 2019 05:46) (97% - 100%)    Gen: A&O x 3, NAD  Chest: CTABL  Cardiac: S1+S2+ RRR  Breast: Soft, nontender, nonengorged  Abdomen: +BS; soft; Nontender, nondistended, dressing removed Incision C/D/I steri strips in place   Gyn: Minimal lochia  Extremities: Nontender, DTRS 2+, no worsening edema                          9.3    9.29  )-----------( 269      ( 24 Oct 2019 07:08 )             28.7

## 2019-10-24 NOTE — DISCHARGE NOTE OB - PATIENT PORTAL LINK FT
You can access the FollowMyHealth Patient Portal offered by [facility] by registering at the following website: [email or web address]. By joining [facility]’s FollowMyHealth portal, you will also be able to view your health information using other applications (apps) compatible with our system.

## 2019-10-24 NOTE — PROGRESS NOTE ADULT - ASSESSMENT
PA NOTE:  POD#1 s/p sched rpt cs   in the room   acute blood loss anemia post op hemodynamically stable iron supplement  routine rpt cbc in am  dc planning 10/26/19    MEDICATIONS  (STANDING):  acetaminophen   Tablet .. 975 milliGRAM(s) Oral <User Schedule>  ascorbic acid 500 milliGRAM(s) Oral daily  diphtheria/tetanus/pertussis (acellular) Vaccine (ADAcel) 0.5 milliLiter(s) IntraMuscular once  ferrous    sulfate 325 milliGRAM(s) Oral daily  folic acid 1 milliGRAM(s) Oral daily  heparin  Injectable 5000 Unit(s) SubCutaneous every 12 hours  ibuprofen  Tablet. 600 milliGRAM(s) Oral every 6 hours  prenatal multivitamin 1 Tablet(s) Oral daily    MEDICATIONS  (PRN):  diphenhydrAMINE 25 milliGRAM(s) Oral every 6 hours PRN Itching  lanolin Ointment 1 Application(s) Topical every 6 hours PRN Sore Nipples  oxyCODONE    IR 5 milliGRAM(s) Oral every 3 hours PRN Moderate to Severe Pain (4-10)  oxyCODONE    IR 5 milliGRAM(s) Oral once PRN Moderate to Severe Pain (4-10)  simethicone 80 milliGRAM(s) Chew every 4 hours PRN Gas

## 2019-10-24 NOTE — DISCHARGE NOTE OB - CARE PROVIDER_API CALL
Galen Vo)  Obstetrics and Gynecology  [facility] [address] [place] [address]  Phone: [phone]  Fax: [phone]  Follow Up Time: 2 weeks

## 2019-10-24 NOTE — DISCHARGE NOTE OB - MEDICATION SUMMARY - MEDICATIONS TO TAKE
I will START or STAY ON the medications listed below when I get home from the hospital:    ibuprofen 600 mg oral tablet  -- 1 tab(s) by mouth every 6 hours, As Needed -for moderate pain   -- Indication: For moderate pain     Claritin 10 mg oral tablet  -- 1 tab(s) by mouth once a day, As Needed  -- Indication: For Allergies    Prenatal 1 Plus 1 oral tablet  -- 1 tab(s) by mouth once a day  -- Indication: For supplement    FeroSul 325 mg (65 mg elemental iron) oral tablet  -- 1 tab(s) by mouth 2 times a day   -- Indication: For Acute blood loss as cause of postoperative anemia    simethicone 80 mg oral tablet, chewable  -- 1 tab(s) by mouth every 4 hours, As needed, Gas or bloating  -- Indication: For gas or bloating I will START or STAY ON the medications listed below when I get home from the hospital:    ibuprofen 600 mg oral tablet  -- 1 tab(s) by mouth every 6 hours, As Needed -for moderate pain   -- Indication: For  delivery delivered    Tylenol Extra Strength 500 mg oral tablet  -- 2 tab(s) by mouth every 6 hours, As Needed   -- This product contains acetaminophen.  Do not use  with any other product containing acetaminophen to prevent possible liver damage.    -- Indication: For  delivery delivered    Claritin 10 mg oral tablet  -- 1 tab(s) by mouth once a day, As Needed  -- Indication: For Allergies    Prenatal 1 Plus 1 oral tablet  -- 1 tab(s) by mouth once a day  -- Indication: For supplement    FeroSul 325 mg (65 mg elemental iron) oral tablet  -- 1 tab(s) by mouth 2 times a day   -- Indication: For Acute blood loss as cause of postoperative anemia    simethicone 80 mg oral tablet, chewable  -- 1 tab(s) by mouth every 4 hours, As needed, Gas or bloating  -- Indication: For gas or bloating

## 2019-10-24 NOTE — DISCHARGE NOTE OB - CARE PLAN
Principal Discharge DX:	 delivery delivered  Goal:	routine wound check in 2weeks call and set up appointment  Assessment and plan of treatment:	no sex nothing in vagina no heavy lifting no pushing no straining no strenuous activities  pain medication as needed; stool softener; dulcolax as needed if constipated  walk for exercise: helps recovery   continue prenatal vitamins daily especially whole course of breastfeeding  see your OB in the office for follow up post partum check call the office set up appointment in 1-2weeks  clean wound daily with soap and water; please note wound for redness or swelling; if noted go to the office right away so the doctor can evaluate the wound for possible wound infection  Secondary Diagnosis:	Acute blood loss as cause of postoperative anemia  Goal:	iron supplement

## 2019-10-25 DIAGNOSIS — D62 ACUTE POSTHEMORRHAGIC ANEMIA: ICD-10-CM

## 2019-10-25 LAB
BASOPHILS # BLD AUTO: 0.04 K/UL — SIGNIFICANT CHANGE UP (ref 0–0.2)
BASOPHILS NFR BLD AUTO: 0.4 % — SIGNIFICANT CHANGE UP (ref 0–2)
EOSINOPHIL # BLD AUTO: 0.32 K/UL — SIGNIFICANT CHANGE UP (ref 0–0.5)
EOSINOPHIL NFR BLD AUTO: 3.3 % — SIGNIFICANT CHANGE UP (ref 0–6)
HCT VFR BLD CALC: 29.7 % — LOW (ref 34.5–45)
HGB BLD-MCNC: 9.6 G/DL — LOW (ref 11.5–15.5)
IMM GRANULOCYTES NFR BLD AUTO: 0.8 % — SIGNIFICANT CHANGE UP (ref 0–1.5)
LYMPHOCYTES # BLD AUTO: 3.24 K/UL — SIGNIFICANT CHANGE UP (ref 1–3.3)
LYMPHOCYTES # BLD AUTO: 33.9 % — SIGNIFICANT CHANGE UP (ref 13–44)
MCHC RBC-ENTMCNC: 28.8 PG — SIGNIFICANT CHANGE UP (ref 27–34)
MCHC RBC-ENTMCNC: 32.3 GM/DL — SIGNIFICANT CHANGE UP (ref 32–36)
MCV RBC AUTO: 89.2 FL — SIGNIFICANT CHANGE UP (ref 80–100)
MONOCYTES # BLD AUTO: 0.58 K/UL — SIGNIFICANT CHANGE UP (ref 0–0.9)
MONOCYTES NFR BLD AUTO: 6.1 % — SIGNIFICANT CHANGE UP (ref 2–14)
NEUTROPHILS # BLD AUTO: 5.31 K/UL — SIGNIFICANT CHANGE UP (ref 1.8–7.4)
NEUTROPHILS NFR BLD AUTO: 55.5 % — SIGNIFICANT CHANGE UP (ref 43–77)
NRBC # BLD: 0 /100 WBCS — SIGNIFICANT CHANGE UP (ref 0–0)
PLATELET # BLD AUTO: 311 K/UL — SIGNIFICANT CHANGE UP (ref 150–400)
RBC # BLD: 3.33 M/UL — LOW (ref 3.8–5.2)
RBC # FLD: 16 % — HIGH (ref 10.3–14.5)
WBC # BLD: 9.57 K/UL — SIGNIFICANT CHANGE UP (ref 3.8–10.5)
WBC # FLD AUTO: 9.57 K/UL — SIGNIFICANT CHANGE UP (ref 3.8–10.5)

## 2019-10-25 RX ORDER — SIMETHICONE 80 MG/1
1 TABLET, CHEWABLE ORAL
Qty: 30 | Refills: 0
Start: 2019-10-25 | End: 2019-10-29

## 2019-10-25 RX ORDER — IBUPROFEN 200 MG
1 TABLET ORAL
Qty: 40 | Refills: 0
Start: 2019-10-25 | End: 2019-11-03

## 2019-10-25 RX ORDER — FERROUS SULFATE 325(65) MG
1 TABLET ORAL
Qty: 60 | Refills: 0
Start: 2019-10-25 | End: 2019-11-23

## 2019-10-25 RX ADMIN — Medication 975 MILLIGRAM(S): at 21:50

## 2019-10-25 RX ADMIN — HEPARIN SODIUM 5000 UNIT(S): 5000 INJECTION INTRAVENOUS; SUBCUTANEOUS at 23:45

## 2019-10-25 RX ADMIN — Medication 600 MILLIGRAM(S): at 13:00

## 2019-10-25 RX ADMIN — Medication 600 MILLIGRAM(S): at 00:45

## 2019-10-25 RX ADMIN — Medication 325 MILLIGRAM(S): at 12:09

## 2019-10-25 RX ADMIN — Medication 975 MILLIGRAM(S): at 10:00

## 2019-10-25 RX ADMIN — SIMETHICONE 80 MILLIGRAM(S): 80 TABLET, CHEWABLE ORAL at 23:48

## 2019-10-25 RX ADMIN — SIMETHICONE 80 MILLIGRAM(S): 80 TABLET, CHEWABLE ORAL at 05:58

## 2019-10-25 RX ADMIN — Medication 975 MILLIGRAM(S): at 03:42

## 2019-10-25 RX ADMIN — Medication 600 MILLIGRAM(S): at 00:14

## 2019-10-25 RX ADMIN — Medication 1 TABLET(S): at 12:08

## 2019-10-25 RX ADMIN — Medication 600 MILLIGRAM(S): at 18:40

## 2019-10-25 RX ADMIN — Medication 600 MILLIGRAM(S): at 12:09

## 2019-10-25 RX ADMIN — HEPARIN SODIUM 5000 UNIT(S): 5000 INJECTION INTRAVENOUS; SUBCUTANEOUS at 12:09

## 2019-10-25 RX ADMIN — Medication 600 MILLIGRAM(S): at 06:30

## 2019-10-25 RX ADMIN — Medication 975 MILLIGRAM(S): at 09:12

## 2019-10-25 RX ADMIN — Medication 500 MILLIGRAM(S): at 12:08

## 2019-10-25 RX ADMIN — Medication 600 MILLIGRAM(S): at 23:44

## 2019-10-25 RX ADMIN — Medication 975 MILLIGRAM(S): at 15:20

## 2019-10-25 RX ADMIN — Medication 600 MILLIGRAM(S): at 17:57

## 2019-10-25 RX ADMIN — Medication 975 MILLIGRAM(S): at 21:26

## 2019-10-25 RX ADMIN — Medication 975 MILLIGRAM(S): at 14:30

## 2019-10-25 RX ADMIN — Medication 600 MILLIGRAM(S): at 05:58

## 2019-10-25 RX ADMIN — HEPARIN SODIUM 5000 UNIT(S): 5000 INJECTION INTRAVENOUS; SUBCUTANEOUS at 00:15

## 2019-10-25 RX ADMIN — Medication 1 MILLIGRAM(S): at 12:08

## 2019-10-25 RX ADMIN — Medication 975 MILLIGRAM(S): at 04:20

## 2019-10-25 NOTE — PROGRESS NOTE ADULT - PROBLEM SELECTOR PLAN 1
Pain management  continue breastfeeding  OOB/ incentive spirometer use encouraged  VTE prophylaxis  dc tomorrow

## 2019-10-25 NOTE — PROGRESS NOTE ADULT - SUBJECTIVE AND OBJECTIVE BOX
Patient evaluated at bedside, offers no acute complaints.  Resting comfortably with baby at bedside.  Tolerating regular diet, Voiding without difficulty; +flatus, -BM  Currently breast and bottlefeeding.  Denies fever/chills, nausea/vomiting, headache, dizziness, chest pains, shortness of breath, palpitations    Vital Signs Last 24 Hrs  T(C): 36.7 (25 Oct 2019 05:54), Max: 37.1 (24 Oct 2019 10:50)  T(F): 98 (25 Oct 2019 05:54), Max: 98.7 (24 Oct 2019 10:50)  HR: 64 (25 Oct 2019 05:54) (64 - 74)  BP: 102/67 (25 Oct 2019 05:54) (91/53 - 112/71)  RR: 16 (25 Oct 2019 05:54) (16 - 17)  SpO2: 99% (25 Oct 2019 05:54) (97% - 99%)    PE: Pt appears comfortable, NAD, AAOx3  Chest: CTA bilaterally, no W/R/R  Cardiac: RRR  Breasts: soft, NT/nonengorged bilaterally; no masses, no erythema  Abd: soft; NT; no guarding or rebound; +BS x4 quad; Fundus firm NT; incision C/D/I with steristrips in place, no erythema, no wound drainage or bleeding, no swelling  Gyn: minimal  Ext: soft, NT; DTRs 2+ bilaterally; no worsening edema                          9.6    9.57  )-----------( 311      ( 25 Oct 2019 06:30 )             29.7       d/w Dr. Vo

## 2019-10-26 VITALS
RESPIRATION RATE: 17 BRPM | OXYGEN SATURATION: 99 % | DIASTOLIC BLOOD PRESSURE: 65 MMHG | SYSTOLIC BLOOD PRESSURE: 103 MMHG | TEMPERATURE: 98 F | HEART RATE: 54 BPM

## 2019-10-26 PROCEDURE — 86780 TREPONEMA PALLIDUM: CPT

## 2019-10-26 PROCEDURE — 36415 COLL VENOUS BLD VENIPUNCTURE: CPT

## 2019-10-26 PROCEDURE — 85027 COMPLETE CBC AUTOMATED: CPT

## 2019-10-26 PROCEDURE — 88307 TISSUE EXAM BY PATHOLOGIST: CPT

## 2019-10-26 PROCEDURE — 59050 FETAL MONITOR W/REPORT: CPT

## 2019-10-26 RX ORDER — ACETAMINOPHEN 500 MG
2 TABLET ORAL
Qty: 40 | Refills: 0
Start: 2019-10-26 | End: 2019-10-30

## 2019-10-26 RX ADMIN — Medication 600 MILLIGRAM(S): at 06:50

## 2019-10-26 RX ADMIN — Medication 600 MILLIGRAM(S): at 06:21

## 2019-10-26 RX ADMIN — Medication 975 MILLIGRAM(S): at 09:58

## 2019-10-26 RX ADMIN — Medication 975 MILLIGRAM(S): at 10:15

## 2019-10-26 RX ADMIN — Medication 600 MILLIGRAM(S): at 00:15

## 2019-10-26 NOTE — PROGRESS NOTE ADULT - SUBJECTIVE AND OBJECTIVE BOX
PA NOTE:  POD#3   seen by dr urbina    Patient seen at bedside resting comfortably offers no new complaints. + Ambulation, + void without difficulty, + flatus; +bm;  tolerating regular diet. both breastfeeding and bottle feeding. Denies HA, CP, SOB, N/V/D,  dizziness, palpitations, worsening abdominal pain, worsening vaginal bleeding, or any other concerns.     Vital Signs Last 24 Hrs  T(C): 36.6 (26 Oct 2019 06:43), Max: 36.8 (25 Oct 2019 18:03)  T(F): 97.9 (26 Oct 2019 06:43), Max: 98.2 (25 Oct 2019 18:03)  HR: 54 (26 Oct 2019 06:43) (54 - 61)  BP: 103/65 (26 Oct 2019 06:43) (103/65 - 121/81)  BP(mean): --  RR: 17 (26 Oct 2019 06:43) (17 - 17)  SpO2: 99% (26 Oct 2019 06:43) (99% - 99%)    Gen: A&O x 3, NAD  Chest: CTABL  Cardiac: S1+S2+ RRR  Breast: Soft, nontender, nonengorged  Abdomen: +BS; soft; Nontender, nondistended, dressing removed Incision C/D/I steri strips in place   Gyn: Minimal lochia  Extremities: Nontender, DTRS 2+, no worsening edema                          9.6    9.57  )-----------( 311      ( 25 Oct 2019 06:30 )             29.7

## 2019-10-26 NOTE — PROGRESS NOTE ADULT - ASSESSMENT
pod#3 s/p sched rpt cs  -seen by dr urbina at bedside  -plan dc home today wound check in office routine 1-2weeks  -dc instructions verbalized

## 2019-10-29 LAB — SURGICAL PATHOLOGY STUDY: SIGNIFICANT CHANGE UP

## 2021-12-09 NOTE — PROGRESS NOTE ADULT - PROVIDER SPECIALTY LIST ADULT
[facility] Medicine Services  PROGRESS NOTE    Patient Name: Jeaneth Keita  : 1956  MRN: [id number]    Date of Admission: 2021  Primary Care Physician: Provider, No Known    Subjective   Subjective     CC:  AMS    HPI:  Nurse called facility and states that was told that patient confused at baseline (not oriented to place).  Patient states that she is feeling better.  Breathing ok.  No further diarrhea.     ROS:  Gen- No fevers, chills  CV- No chest pain, palpitations  Resp- No cough, dyspnea  GI- No N/V/D, abd pain        Objective   Objective     Vital Signs:   Temp:  [98.4 °F (36.9 °C)-99.3 °F (37.4 °C)] 99.2 °F (37.3 °C)  Heart Rate:  [] 94  Resp:  [16-18] 16  BP: (119-166)/(63-98) 123/63     Physical Exam:  Constitutional: No acute distress, awake, alert, sitting up in chair  HENT: NCAT, mucous membranes moist  Respiratory: Clear to auscultation bilaterally, respiratory effort normal   Cardiovascular: RRR, no murmurs, rubs, or gallops  Gastrointestinal: Positive bowel sounds, soft, nontender, nondistended  Musculoskeletal: No bilateral ankle edema, left BKA  Psychiatric: Appropriate affect, cooperative  Neurologic: Oriented x 3, strength symmetric in all extremities, Cranial Nerves grossly intact to confrontation, speech clear  Skin: No rashes      Results Reviewed:  LAB RESULTS:      Lab 21  0322 21  1714 21  1404   WBC 5.33  --  5.45   HEMOGLOBIN 8.8*  --  10.3*   HEMATOCRIT 25.5*  --  28.7*   PLATELETS 115*  --  158   NEUTROS ABS 4.45  --  3.56   IMMATURE GRANS (ABS) 0.02  --  0.02   LYMPHS ABS 0.43*  --  1.25   MONOS ABS 0.29  --  0.52   EOS ABS 0.11  --  0.07   MCV 93.8  --  90.8   PROCALCITONIN 0.14  --  0.13   LACTATE 1.3 1.4  --          Lab 21  0322 21  1404   SODIUM 142 141   POTASSIUM 3.2* 3.1*   CHLORIDE 106 101   CO2 24.0 28.0   ANION GAP 12.0 12.0   BUN 24* 24*   CREATININE 3.73* 3.46*   GLUCOSE 148* 246*   CALCIUM  OB 7.4* 8.4*   MAGNESIUM 1.6 1.9   HEMOGLOBIN A1C 5.10  --    TSH  --  0.759         Lab 12/08/21  1404   TOTAL PROTEIN 6.5   ALBUMIN 3.60   GLOBULIN 2.9   ALT (SGPT) 28   AST (SGOT) 31   BILIRUBIN 0.5   ALK PHOS 90                     Lab 12/08/21  2105   PH, ARTERIAL 7.581*   PCO2, ARTERIAL 28.4*   PO2 ART 92.6   FIO2 21   HCO3 ART 26.7*   BASE EXCESS ART 4.9*   CARBOXYHEMOGLOBIN 1.1     Brief Urine Lab Results  (Last result in the past 365 days)      Color   Clarity   Blood   Leuk Est   Nitrite   Protein   CREAT   Urine HCG        12/08/21 1404 Yellow   Clear   Negative   Negative   Negative   >=300 mg/dL (3+)                 Microbiology Results Abnormal     Procedure Component Value - Date/Time    MRSA Screen, PCR (Inpatient) - Swab, Nares [[id number]]  (Normal) Collected: 12/08/21 2213    Lab Status: Final result Specimen: Swab from Nares Updated: 12/09/21 0749     MRSA PCR Negative    Narrative:      MRSA Negative    S. Pneumo Ag Urine or CSF - Urine, Urine, Clean Catch [[id number]]  (Normal) Collected: 12/08/21 1404    Lab Status: Final result Specimen: Urine, Clean Catch Updated: 12/09/21 0709     Strep Pneumo Ag Negative    Legionella Antigen, Urine - Urine, Urine, Clean Catch [[id number]]  (Normal) Collected: 12/08/21 1404    Lab Status: Final result Specimen: Urine, Clean Catch Updated: 12/09/21 0706     LEGIONELLA ANTIGEN, URINE Negative    Respiratory Panel PCR w/COVID-19(SARS-CoV-2) RUBINA/MANDY/MARK/PAD/COR/MAD/HORACE In-House, NP Swab in UTM/VTM, 3-4 HR TAT - Swab, Nasopharynx [[id number]]  (Normal) Collected: 12/08/21 2213    Lab Status: Final result Specimen: Swab from Nasopharynx Updated: 12/09/21 0003     ADENOVIRUS, PCR Not Detected     Coronavirus 229E Not Detected     Coronavirus HKU1 Not Detected     Coronavirus NL63 Not Detected     Coronavirus OC43 Not Detected     COVID19 Not Detected     Human Metapneumovirus Not Detected     Human Rhinovirus/Enterovirus Not Detected     Influenza A PCR Not Detected      Influenza B PCR Not Detected     Parainfluenza Virus 1 Not Detected     Parainfluenza Virus 2 Not Detected     Parainfluenza Virus 3 Not Detected     Parainfluenza Virus 4 Not Detected     RSV, PCR Not Detected     Bordetella pertussis pcr Not Detected     Bordetella parapertussis PCR Not Detected     Chlamydophila pneumoniae PCR Not Detected     Mycoplasma pneumo by PCR Not Detected    Narrative:      In the setting of a positive respiratory panel with a viral infection PLUS a negative procalcitonin without other underlying concern for bacterial infection, consider observing off antibiotics or discontinuation of antibiotics and continue supportive care. If the respiratory panel is positive for atypical bacterial infection (Bordetella pertussis, Chlamydophila pneumoniae, or Mycoplasma pneumoniae), consider antibiotic de-escalation to target atypical bacterial infection.          CT Abdomen Pelvis Without Contrast    Result Date: 12/8/2021  EXAMINATION: CT ABDOMEN/PELVIS WO CONTRAST - 12/08/2021  INDICATION: R41.0-Disorientation, unspecified. Generalized abdominal pain.  TECHNIQUE: Multiple axial CT imaging is obtained of the abdomen and pelvis without the administration of intravenous contrast.  The radiation dose reduction device was turned on for each scan per the ALARA (As Low as Reasonably Achievable) protocol.  COMPARISON: None  FINDINGS: There is infiltrate in the right lower lobe suggesting pneumonia. Stones identified in the gallbladder. There is a cystic structure identified with thin-walled calcification seen in the region of the becky hepatis adjacent to the IVC and distal esophagus. This area measures 3.9 x 4.0 cm. Findings are uncertain and contrast enhanced imaging can be performed for further evaluation. Pancreas in its visualized portions is unremarkable. Kidneys and adrenal glands are within normal limits. The abdominal portion of the gastrointestinal tract within normal limits. No free fluid or  free air. No abnormal mass or fluid collections identified. Bony structures are unremarkable.  PELVIS: Some mild wall thickening identified of the distal descending colon and sigmoid colon suggesting possibly a mild colitis. No significant abnormal mass or fluid collection. Pelvic organs are unremarkable. The pelvic portions of the gastrointestinal tract are otherwise within normal limits. No pelvic adenopathy. No free fluid or free air.      Impression: 1. Right lower lobe pneumonia. 2. Wall thickening of the sigmoid colon diffusely suggesting possibly a mild colitis with no significant stranding. 3. Cystic structure seen in the becky hepatis adjacent to the IVC and distal esophagus for which contrast enhanced imaging can be performed for further evaluation of this area. There are stones filling the gallbladder.  DICTATED:   12/08/2021 EDITED/lfs:   12/08/2021      CT Head Without Contrast    Result Date: 12/8/2021  EXAMINATION: CT HEAD WO CONTRAST-12/08/2021:  INDICATION: AMS; R41.0-Disorientation, unspecified, mental status change.  TECHNIQUE: Multiple axial CT imaging was obtained of the head without the administration of intravenous contrast.  The radiation dose reduction device was turned on for each scan per the ALARA (As Low as Reasonably Achievable) protocol.  COMPARISON: NONE.  FINDINGS: The brain parenchyma is unremarkable. There is low-density areas seen in the periventricular and subcortical white matter suggesting chronic small vessel ischemic change. No hemorrhage or hydrocephalus. No mass, mass effect, or midline shift. The bony structures reveal no evidence of osseous abnormality. The visualized paranasal sinuses are clear. The mastoid air cells are patent.      Impression: No CT evidence of acute intracranial abnormality. Chronic changes seen within the brain.  D:  12/08/2021 E:  12/08/2021       XR Chest 1 View    Result Date: 12/8/2021  EXAMINATION: XR CHEST 1 VW-12/08/2021:  INDICATION: AMS.   COMPARISON: 10/05/2021.  FINDINGS: Portable chest reveals ill-defined opacification seen in the right infrahilar region. The findings are stable when compared to the prior study. Degenerative changes seen within the spine. The left lung is clear.      Impression: Mild increased markings identified in the right infrahilar region stable and unchanged. Dialysis catheter placed on the right with tip in the SVC.  D:  12/08/2021 E:  12/08/2021          Results for orders placed during the hospital encounter of 10/05/21    Adult Transthoracic Echo Complete W/ Cont if Necessary Per Protocol    Interpretation Summary  · Left ventricular ejection fraction appears to be 61 - 65%. Left ventricular systolic function is normal.  · Left atrial volume is mildly increased.      I have reviewed the medications:  Scheduled Meds:amLODIPine, 10 mg, Oral, Q24H  atorvastatin, 80 mg, Oral, Daily  calcium acetate, 667 mg, Oral, TID With Meals  carvedilol, 25 mg, Oral, BID With Meals  doxycycline, 100 mg, Intravenous, Q12H  heparin (porcine), 5,000 Units, Subcutaneous, Q8H  hydrALAZINE, 100 mg, Oral, Q8H  insulin lispro, 0-7 Units, Subcutaneous, TID AC  ipratropium-albuterol, 3 mL, Nebulization, 4x Daily - RT  piperacillin-tazobactam, 3.375 g, Intravenous, Q12H  sodium chloride, 10 mL, Intravenous, Q12H      Continuous Infusions:   PRN Meds:.•  acetaminophen **OR** acetaminophen **OR** acetaminophen  •  dextrose  •  dextrose  •  glucagon (human recombinant)  •  ipratropium-albuterol  •  Morphine  •  naloxone  •  ondansetron  •  sodium chloride    Assessment/Plan   Assessment & Plan     Active Hospital Problems    Diagnosis  POA   • **Right lower lobe pneumonia [J18.9]  Yes   • AMS (altered mental status) [R41.82]  Yes   • Colitis [K52.9]  Unknown   • Normocytic anemia [D64.9]  Unknown   • Hypokalemia [E87.6]  Unknown   • Hepatocellular carcinoma metastatic to bone s/p RXT  [C79.51, C22.0]  Yes   • Cirrhosis of liver (HCC) [K74.60]  Yes   •  Hemochromatosis [E83.119]  Yes   • S/P left BKA [Z89.512]  Not Applicable   • Chronic Hep C  [B18.2]  Yes   • Chronic pain on Methadone [F11.90]  Yes   • Chronic ulcer of right foot [L97.519]  Yes   • ESRD (end stage renal disease) [N18.6]  Yes   • GERD (gastroesophageal reflux disease) [K21.9]  Yes   • Essential hypertension [I10]  Yes   • Type 2 diabetes mellitus (HCC) [E11.9]  Yes      Resolved Hospital Problems   No resolved problems to display.        Brief Hospital Course to date:  Jeaneth Keita is a 65 y.o. female with a PMH significant for ESRD on HD, reports of insulin-dependent diabetes mellitus type 2 with no active medications on home med list, chronic hepatitis C, familial hemochromatosis, cirrhosis, hypertension, left BKA, fibromyalgia who is currently residing at [facility] who presents to the ED due to altered mental status.     Altered mental status  Colitis  RLL Pneumonia  -AMS likely due to acute infection  -cont Zosyn, doxycycline  -MRSA PCR negative  -Blood culture no growth at 24 hours.  urine legionella and strep both negative  -Stool studies pending but no further diarreha  -DuoNeb scheduled and as needed  -ABG shows an alkalosis  -UA negative  -Ammonia WNL  -CT head WNL  -could consider MRI with and without contrast given history of metastatic hepatocellular carcinoma if approved by nephrology BUT mentation seems to be improving  --TSH wnl.  Check NH3 and B12 levels and RPR     ESRD, (?On HD)  Hypokalemia  -nephrology following, plans HD tomorrow  -Mild hypokalemia, defer to nephrology for replacement with HD     Type 2 diabetes mellitus  -Prior EMR reports insulin-dependent DM, no insulin on current home med list  -SSI for now  --A1c 5.10 which is NOT c/w DM if not on any meds at home     HTN  GERD  Status post left BKA  Chronic hep C  Metastatic hepatocellular carcinoma  Familial hemochromatosis  Chronic ulcer to right foot  -Continue home meds  -EMR reports patient was on  chemotherapy in October, none actively listed  -Has previously received all of her [facility] from [facility]     Chronic pain on methadone  -Methadone not on active med list  -UDS negative  -Richie reviewed with no active prescription        DVT prophylaxis:  Medical and mechanical DVT prophylaxis orders are present. sub q heparin      AM-PAC 6 Clicks Score (PT): 12 (12/08/21 2110)    Disposition: I expect the patient to be discharged TBD.  Lives [facility]    CODE STATUS:   Code Status and Medical Interventions:   Ordered at: 12/08/21 1940     Level Of Support Discussed With:    Patient     Code Status (Patient has no pulse and is not breathing):    CPR (Attempt to Resuscitate)     Medical Interventions (Patient has pulse or is breathing):    Full Support       Keon Marin MD  12/09/21

## 2023-11-07 NOTE — ED PROVIDER NOTE - CPE EDP EYES NORM
MEDICATIONS  (PRN):  acetaminophen     Tablet .. 650 milliGRAM(s) Oral every 6 hours PRN Moderate Pain (4 - 6)  albuterol    90 MICROgram(s) HFA Inhaler 2 Puff(s) Inhalation every 6 hours PRN Shortness of Breath and/or Wheezing  aluminum hydroxide/magnesium hydroxide/simethicone Suspension 30 milliLiter(s) Oral every 4 hours PRN Dyspepsia  benzocaine 20% Spray 1 Spray(s) Topical every 6 hours PRN tooth pain  chlorproMAZINE    Injectable 50 milliGRAM(s) IntraMuscular once PRN severe agitation  chlorproMAZINE    Tablet 50 milliGRAM(s) Oral every 6 hours PRN agitation  hydrOXYzine hydrochloride 50 milliGRAM(s) Oral every 12 hours PRN Anxiety  ibuprofen  Tablet. 400 milliGRAM(s) Oral every 6 hours PRN Moderate Pain (4 - 6), Severe Pain (7 - 10)  LORazepam     Tablet 2 milliGRAM(s) Oral every 8 hours PRN Anxiety  LORazepam   Injectable 2 milliGRAM(s) IntraMuscular once PRN Assaultive behavior  melatonin. 5 milliGRAM(s) Oral at bedtime PRN Insomnia  ondansetron    Tablet 4 milliGRAM(s) Oral every 6 hours PRN nausea   normal...

## 2024-11-19 NOTE — H&P ADULT - PMH
Take Tylenol and Motrin as needed for pain and follow up with the regular provider for reassessment.  You can return for concerning symptoms.  Thank you.  
No pertinent past medical history
No

## 2024-12-18 NOTE — H&P ADULT - NSHPRISKPAPSMEAR_GEN_A_CORE
Glucose 296 (*)     All other components within normal limits   POCT GLUCOSE - Abnormal; Notable for the following components:    POC Glucose 214 (*)     All other components within normal limits   POCT GLUCOSE - Abnormal; Notable for the following components:    POC Glucose 275 (*)     All other components within normal limits   POCT GLUCOSE - Normal   CBC WITH AUTO DIFFERENTIAL   ANION GAP   GLOMERULAR FILTRATION RATE, ESTIMATED   OSMOLALITY   LIPASE         Medical Decision Making  Hyperglycemia. Beta hydroxy and ketone slightly elevated. VBG and anion gap are normal. Treated with hydration- 2Liter fluids. PO challenge complete. Blood glucose trended appropriately. Ambulated steadily. Started on Metformin. Referred to Endo. Educated on glucose log. Patient scheduled for PCP follow-up.     Amount and/or Complexity of Data Reviewed  Labs: ordered.  Radiology: ordered.    Risk  Prescription drug management.         FINAL IMPRESSION   Final diagnoses:   Hyperglycemia   Other specified diabetes mellitus without complication, without long-term current use of insulin (HCC)   Dehydration     DISPOSITION Decision To Discharge 12/17/2024 02:48:41 PM  Condition at Disposition: Stable    PATIENT REFERRED TO:  Bandar Suarez MD  [address] [place], [place] [id number]  [phone]    Call today      Jackson Renee MD  [address] [place], [place] [id number]  [phone]    Call today  to schedule an appointment at next available      PLAN   DISCHARGE MEDICATIONS:  Discharge Medication List as of 12/17/2024  2:48 PM        START taking these medications    Details   metFORMIN (GLUCOPHAGE) 500 MG tablet Take 1 tablet by mouth 2 times daily (with meals), Disp-60 tablet, R-0Normal      Blood Glucose Monitoring Suppl (ONE TOUCH ULTRA 2) w/Device KIT DAILY Starting Tue 12/17/2024, Disp-1 kit, R-0, Normal      blood glucose monitor strips 1 strip by Other route 2 times daily Test 2 times a day & as needed for symptoms of irregular  yes